# Patient Record
Sex: FEMALE | Race: OTHER | NOT HISPANIC OR LATINO | URBAN - METROPOLITAN AREA
[De-identification: names, ages, dates, MRNs, and addresses within clinical notes are randomized per-mention and may not be internally consistent; named-entity substitution may affect disease eponyms.]

---

## 2023-11-14 ENCOUNTER — OUTPATIENT (OUTPATIENT)
Dept: OUTPATIENT SERVICES | Facility: HOSPITAL | Age: 23
LOS: 1 days | End: 2023-11-14
Payer: MEDICAID

## 2023-11-14 VITALS
TEMPERATURE: 98 F | SYSTOLIC BLOOD PRESSURE: 99 MMHG | OXYGEN SATURATION: 100 % | WEIGHT: 119.49 LBS | DIASTOLIC BLOOD PRESSURE: 65 MMHG | HEART RATE: 70 BPM | HEIGHT: 64 IN | RESPIRATION RATE: 16 BRPM

## 2023-11-14 DIAGNOSIS — Z01.818 ENCOUNTER FOR OTHER PREPROCEDURAL EXAMINATION: ICD-10-CM

## 2023-11-14 DIAGNOSIS — M20.11 HALLUX VALGUS (ACQUIRED), RIGHT FOOT: ICD-10-CM

## 2023-11-14 DIAGNOSIS — M24.574 CONTRACTURE, RIGHT FOOT: ICD-10-CM

## 2023-11-14 DIAGNOSIS — M20.41 OTHER HAMMER TOE(S) (ACQUIRED), RIGHT FOOT: ICD-10-CM

## 2023-11-14 DIAGNOSIS — K08.409 PARTIAL LOSS OF TEETH, UNSPECIFIED CAUSE, UNSPECIFIED CLASS: Chronic | ICD-10-CM

## 2023-11-14 DIAGNOSIS — M25.774 OSTEOPHYTE, RIGHT FOOT: ICD-10-CM

## 2023-11-14 PROCEDURE — G0463: CPT

## 2023-11-14 NOTE — H&P PST ADULT - HISTORY OF PRESENT ILLNESS
This is a 32 y/o female who presents to PST with pre-operative diagnosis of hallux valgus, hammer toe in right foot.  Pt reports right foot pain with weight bearing and certain shoes.  Otherwise patient feels well today and denies any acute symptoms.

## 2023-11-17 ENCOUNTER — OUTPATIENT (OUTPATIENT)
Dept: OUTPATIENT SERVICES | Facility: HOSPITAL | Age: 23
LOS: 1 days | End: 2023-11-17
Payer: MEDICAID

## 2023-11-17 VITALS
SYSTOLIC BLOOD PRESSURE: 112 MMHG | TEMPERATURE: 98 F | WEIGHT: 119.49 LBS | HEART RATE: 80 BPM | OXYGEN SATURATION: 99 % | DIASTOLIC BLOOD PRESSURE: 66 MMHG | RESPIRATION RATE: 17 BRPM | HEIGHT: 64 IN

## 2023-11-17 VITALS
SYSTOLIC BLOOD PRESSURE: 100 MMHG | HEART RATE: 82 BPM | OXYGEN SATURATION: 99 % | TEMPERATURE: 98 F | RESPIRATION RATE: 16 BRPM | DIASTOLIC BLOOD PRESSURE: 57 MMHG

## 2023-11-17 DIAGNOSIS — M20.41 OTHER HAMMER TOE(S) (ACQUIRED), RIGHT FOOT: ICD-10-CM

## 2023-11-17 DIAGNOSIS — K08.409 PARTIAL LOSS OF TEETH, UNSPECIFIED CAUSE, UNSPECIFIED CLASS: Chronic | ICD-10-CM

## 2023-11-17 DIAGNOSIS — M24.574 CONTRACTURE, RIGHT FOOT: ICD-10-CM

## 2023-11-17 DIAGNOSIS — M20.11 HALLUX VALGUS (ACQUIRED), RIGHT FOOT: ICD-10-CM

## 2023-11-17 DIAGNOSIS — M25.774 OSTEOPHYTE, RIGHT FOOT: ICD-10-CM

## 2023-11-17 PROCEDURE — 88311 DECALCIFY TISSUE: CPT

## 2023-11-17 PROCEDURE — 88311 DECALCIFY TISSUE: CPT | Mod: 26

## 2023-11-17 PROCEDURE — 88304 TISSUE EXAM BY PATHOLOGIST: CPT

## 2023-11-17 PROCEDURE — 73620 X-RAY EXAM OF FOOT: CPT | Mod: 26,RT

## 2023-11-17 PROCEDURE — 28270 RELEASE OF FOOT CONTRACTURE: CPT | Mod: T6

## 2023-11-17 PROCEDURE — 28272 RELEASE OF TOE JOINT EACH: CPT | Mod: T7

## 2023-11-17 PROCEDURE — 73620 X-RAY EXAM OF FOOT: CPT

## 2023-11-17 PROCEDURE — C1889: CPT

## 2023-11-17 PROCEDURE — C1713: CPT

## 2023-11-17 PROCEDURE — 28299 COR HLX VLGS DOUBLE OSTEOT: CPT | Mod: T5

## 2023-11-17 PROCEDURE — 88304 TISSUE EXAM BY PATHOLOGIST: CPT | Mod: 26

## 2023-11-17 DEVICE — K-WIRE MICROAIRE (SMOOTH) DOUBLE TROCAR 1.1MM X 4": Type: IMPLANTABLE DEVICE | Site: RIGHT | Status: FUNCTIONAL

## 2023-11-17 DEVICE — SCREW CORTEX S-T 2.7X16MM: Type: IMPLANTABLE DEVICE | Site: RIGHT | Status: FUNCTIONAL

## 2023-11-17 DEVICE — SCREW CORTEX S-T 2.7X18MM: Type: IMPLANTABLE DEVICE | Site: RIGHT | Status: FUNCTIONAL

## 2023-11-17 DEVICE — SCREW CORT SELF TAP 2MM 16MM: Type: IMPLANTABLE DEVICE | Site: RIGHT | Status: FUNCTIONAL

## 2023-11-17 RX ORDER — SODIUM CHLORIDE 9 MG/ML
1000 INJECTION, SOLUTION INTRAVENOUS
Refills: 0 | Status: DISCONTINUED | OUTPATIENT
Start: 2023-11-17 | End: 2023-11-17

## 2023-11-17 RX ORDER — ONDANSETRON 8 MG/1
4 TABLET, FILM COATED ORAL ONCE
Refills: 0 | Status: DISCONTINUED | OUTPATIENT
Start: 2023-11-17 | End: 2023-11-17

## 2023-11-17 RX ORDER — HYDROMORPHONE HYDROCHLORIDE 2 MG/ML
0.5 INJECTION INTRAMUSCULAR; INTRAVENOUS; SUBCUTANEOUS
Refills: 0 | Status: DISCONTINUED | OUTPATIENT
Start: 2023-11-17 | End: 2023-11-17

## 2023-11-17 RX ORDER — HYDROMORPHONE HYDROCHLORIDE 2 MG/ML
1 INJECTION INTRAMUSCULAR; INTRAVENOUS; SUBCUTANEOUS
Refills: 0 | Status: DISCONTINUED | OUTPATIENT
Start: 2023-11-17 | End: 2023-11-17

## 2023-11-17 RX ADMIN — SODIUM CHLORIDE 50 MILLILITER(S): 9 INJECTION, SOLUTION INTRAVENOUS at 10:00

## 2023-11-17 NOTE — ASU DISCHARGE PLAN (ADULT/PEDIATRIC) - ACTIVITY LEVEL
No exercise/No heavy lifting/No sports/gym/Weight bearing as tolerated/Elevate extremity/No intercourse

## 2023-11-17 NOTE — PRE-ANESTHESIA EVALUATION ADULT - NSANTHADDINFOFT_GEN_ALL_CORE
Discussed IVS/regional block by Podiatry/GA with LMA in detail with patient and all questions sought and answered. Pt. agrees to all plans and wishes to proceed with surgical care.    Medical evaluation/optimization and clearance noted and appreciated.

## 2023-11-17 NOTE — ASU PATIENT PROFILE, ADULT - FALL HARM RISK - UNIVERSAL INTERVENTIONS
Bed in lowest position, wheels locked, appropriate side rails in place/Call bell, personal items and telephone in reach/Instruct patient to call for assistance before getting out of bed or chair/Non-slip footwear when patient is out of bed/[place] to call system/Physically safe environment - no spills, clutter or unnecessary equipment/Purposeful Proactive Rounding/Room/bathroom lighting operational, light cord in reach

## 2023-11-17 NOTE — ASU PREOP CHECKLIST - SITE MARKED BY ANESTHESIOLOGIST
BP elevated on departure.  Patient declined being evaluated for BP and stated had BP meds at home and would take them.  
n/a

## 2023-11-17 NOTE — ASU DISCHARGE PLAN (ADULT/PEDIATRIC) - OK TO LEAVE MESSAGE ON VOICEMAIL
Yes Trilobed Flap Text: The defect edges were debeveled with a #15c scalpel blade.  Given the location of the defect and the proximity to free margins a trilobed flap was deemed most appropriate.  Using a sterile surgical marker, an appropriate trilobed flap drawn around the defect.    The area thus outlined was incised deep to adipose tissue with a #15 scalpel blade.  The skin margins were undermined to an appropriate distance in all directions utilizing iris scissors.

## 2023-11-17 NOTE — BRIEF OPERATIVE NOTE - NSICDXBRIEFPOSTOP_GEN_ALL_CORE_FT
POST-OP DIAGNOSIS:  Bunion, right 17-Nov-2023 13:43:49  Pradip Ramirez  Bone spur of toe of right foot 17-Nov-2023 13:44:04  Pradip Ramirez  Hammertoe of right foot 17-Nov-2023 13:44:15  Pradip Ramirez

## 2023-11-17 NOTE — ASU DISCHARGE PLAN (ADULT/PEDIATRIC) - NS MD DC FALL RISK RISK
For information on Fall & Injury Prevention, visit: [email or web address] OR  [email or web address] OR  https://www.cdc.gov/steadi/patient.html

## 2023-11-17 NOTE — BRIEF OPERATIVE NOTE - NSICDXBRIEFPROCEDURE_GEN_ALL_CORE_FT
PROCEDURES:  Jermaine-Ambrosio bunionectomy of right foot 17-Nov-2023 13:40:05  Pradip Ramirez  Modified Segundo bunionectomy 17-Nov-2023 13:40:13  Pradip Ramirez  Capsulotomy of MTP joint 17-Nov-2023 13:42:05  Pradip Ramirez  Tenotomy, flexor, toe, open 17-Nov-2023 13:42:20  Pradip Ramirez  Exostectomy of toe 17-Nov-2023 13:42:49  Pradip Ramirez

## 2023-11-17 NOTE — ASU DISCHARGE PLAN (ADULT/PEDIATRIC) - CARE PROVIDER_API CALL
Pradip Ramirez jessica  Podiatric Medicine and Surgery  [address] [place] [address]  Phone: [phone]  Fax: [phone]  Follow Up Time: 1-3 days

## 2023-11-17 NOTE — BRIEF OPERATIVE NOTE - NSICDXBRIEFPREOP_GEN_ALL_CORE_FT
PRE-OP DIAGNOSIS:  Bunion, right foot 17-Nov-2023 13:43:09  Pradip Ramirez  Bone spur of toe of right foot 17-Nov-2023 13:43:18  Pradip Ramirez  Hammertoe of right foot 17-Nov-2023 13:43:35  Pradip Ramirez

## 2023-11-28 LAB
SURGICAL PATHOLOGY STUDY: SIGNIFICANT CHANGE UP
SURGICAL PATHOLOGY STUDY: SIGNIFICANT CHANGE UP

## (undated) DEVICE — ARTHREX MULTIFIRE SCORPION NEEDLE

## (undated) DEVICE — SOL IRR POUR NS 0.9% 500ML

## (undated) DEVICE — STRYKER INTERPULSE HANDPIECE W IRR SUCTION TUBE

## (undated) DEVICE — ELCTR GROUNDING PAD ADULT COVIDIEN

## (undated) DEVICE — BLADE SCALPEL SAFETYLOCK #15

## (undated) DEVICE — PREP CHLORAPREP HI-LITE ORANGE 26ML

## (undated) DEVICE — DRAPE C ARM UNIVERSAL

## (undated) DEVICE — GLV 7.5 PROTEXIS (BLUE)

## (undated) DEVICE — DRSG ADAPTIC 3 X 8"

## (undated) DEVICE — MARKING PEN WRITESITE PLUS

## (undated) DEVICE — DRSG STOCKINETTE IMPERVIOUS XL

## (undated) DEVICE — GOWN TRIMAX XXL

## (undated) DEVICE — SYR LUER LOK 10CC

## (undated) DEVICE — GLV 6 PROTEXIS (WHITE)

## (undated) DEVICE — GLV 7.5 PROTEXIS (WHITE)

## (undated) DEVICE — DRILL BIT SYNTHES ORTHO QC 2.7X100MM

## (undated) DEVICE — DRILL BIT SYNTHES ORTHO QC 1.5X85MM

## (undated) DEVICE — VENODYNE/SCD SLEEVE CALF MEDIUM

## (undated) DEVICE — ELCTR BOVIE TIP BLADE INSULATED 2.75" EDGE

## (undated) DEVICE — POSITIONER FOAM HEAD CRADLE (PINK)

## (undated) DEVICE — SHOE  POSTOP SOFTIE DARCO M-LG

## (undated) DEVICE — GOWN LG W TOWEL

## (undated) DEVICE — DRAPE TOWEL BLUE 17" X 24"

## (undated) DEVICE — SOLIDIFIER CANN EXPRESS 3K

## (undated) DEVICE — DRILL BIT SYNTHES ORTHO QC 2X100MM

## (undated) DEVICE — CANISTER SUCTION LID GUARD 3000CC

## (undated) DEVICE — SUT POLYSORB 4-0 30" C-13 UNDYED

## (undated) DEVICE — SUT POLYSORB 3-0 18" C-13 UNDYED

## (undated) DEVICE — PACK LOWER EXTREMITY

## (undated) DEVICE — SAW BLADE CONMED LINVATEC RECIPROCATING CROSS CUT SMALL 5.5 X 9.5MM

## (undated) DEVICE — TOURNIQUET CUFF 18" DUAL PORT SINGLE BLADDER W PLC  (BLACK)

## (undated) DEVICE — SAW BLADE MICROAIRE SAGITTAL 9.4MMX25.4MMX0.6MM

## (undated) DEVICE — SHOE  POSTOP SOFTIE DARCO M-SM

## (undated) DEVICE — GLV 6.5 PROTEXIS (WHITE)

## (undated) DEVICE — DRAPE C ARM C-ARMOUR

## (undated) DEVICE — GLV 7 PROTEXIS (WHITE)

## (undated) DEVICE — WARMING BLANKET UPPER ADULT

## (undated) DEVICE — SHOE  POSTOP SOFTIE DARCO M-M

## (undated) DEVICE — SUT POLYSORB 2-0 30" V-20 UNDYED

## (undated) DEVICE — DRSG WEBRIL 4"

## (undated) DEVICE — SUT MONOSOF 5-0 18" C-13

## (undated) DEVICE — DRAPE INSTRUMENT POUCH 6.75" X 11"

## (undated) DEVICE — NDL HYPO REGULAR BEVEL 25G X 1.5" (BLUE)

## (undated) DEVICE — SHOE  POSTOP SOFTIE DARCO W-LG